# Patient Record
Sex: FEMALE
[De-identification: names, ages, dates, MRNs, and addresses within clinical notes are randomized per-mention and may not be internally consistent; named-entity substitution may affect disease eponyms.]

---

## 2024-04-03 PROBLEM — Z00.00 ENCOUNTER FOR PREVENTIVE HEALTH EXAMINATION: Status: ACTIVE | Noted: 2024-04-03

## 2024-04-24 ENCOUNTER — TRANSCRIPTION ENCOUNTER (OUTPATIENT)
Age: 69
End: 2024-04-24

## 2024-04-24 ENCOUNTER — APPOINTMENT (OUTPATIENT)
Dept: VASCULAR SURGERY | Facility: CLINIC | Age: 69
End: 2024-04-24
Payer: MEDICARE

## 2024-04-24 ENCOUNTER — NON-APPOINTMENT (OUTPATIENT)
Age: 69
End: 2024-04-24

## 2024-04-24 VITALS
HEIGHT: 65 IN | WEIGHT: 160 LBS | DIASTOLIC BLOOD PRESSURE: 74 MMHG | SYSTOLIC BLOOD PRESSURE: 105 MMHG | BODY MASS INDEX: 26.66 KG/M2 | HEART RATE: 100 BPM

## 2024-04-24 DIAGNOSIS — I87.2 VENOUS INSUFFICIENCY (CHRONIC) (PERIPHERAL): ICD-10-CM

## 2024-04-24 DIAGNOSIS — I78.1 NEVUS, NON-NEOPLASTIC: ICD-10-CM

## 2024-04-24 DIAGNOSIS — I83.93 ASYMPTOMATIC VARICOSE VEINS OF BILATERAL LOWER EXTREMITIES: ICD-10-CM

## 2024-04-24 PROCEDURE — 99203 OFFICE O/P NEW LOW 30 MIN: CPT

## 2024-04-24 PROCEDURE — 93970 EXTREMITY STUDY: CPT

## 2024-04-25 PROBLEM — I78.1 ASYMPTOMATIC SPIDER VEINS OF BOTH LOWER EXTREMITIES: Status: ACTIVE | Noted: 2024-04-25

## 2024-04-25 PROBLEM — I87.2 CHRONIC VENOUS INSUFFICIENCY: Status: ACTIVE | Noted: 2024-04-25

## 2024-04-25 PROBLEM — I83.93 ASYMPTOMATIC VARICOSE VEINS OF BOTH LOWER EXTREMITIES: Status: ACTIVE | Noted: 2024-04-25

## 2024-04-25 RX ORDER — ATORVASTATIN CALCIUM 10 MG/1
10 TABLET, FILM COATED ORAL
Refills: 0 | Status: ACTIVE | COMMUNITY

## 2024-04-25 RX ORDER — CHROMIUM 200 MCG
TABLET ORAL
Refills: 0 | Status: ACTIVE | COMMUNITY

## 2024-04-25 RX ORDER — FLUTICASONE PROPION/SALMETEROL 500-50 MCG
BLISTER, WITH INHALATION DEVICE INHALATION
Refills: 0 | Status: ACTIVE | COMMUNITY

## 2024-04-25 RX ORDER — OMEPRAZOLE 40 MG/1
40 CAPSULE, DELAYED RELEASE ORAL
Refills: 0 | Status: ACTIVE | COMMUNITY

## 2024-04-25 RX ORDER — ZINC OXIDE 13 %
CREAM (GRAM) TOPICAL
Refills: 0 | Status: ACTIVE | COMMUNITY

## 2024-04-29 NOTE — ADDENDUM
[FreeTextEntry1] : I, Dr. Jah Acevedo, personally performed the evaluation and management (E/M) services for this new patient.  That E/M includes conducting the initial examination, assessing all conditions, and establishing the plan of care.  Today, my ACP, Mago Griffiths NP, was here to observe my evaluation and management services for this patient to be followed going forward.  I, Lois Davis, am scribing for an in the presence of  Dr. Jah Acevedo on this date in the following sections HISTORY OF PRESENT ILLNESS, PAST MEDICAL/FAMILY/SOCIAL HISTORY; REVIEW OF SYSTEMS; VITAL SIGNS; PHYSICAL EXAM; ASSESSMENT/PLAN.

## 2024-04-29 NOTE — ASSESSMENT
[FreeTextEntry1] : 68 y/o F w/ remote hx of bilateral GSV ablations vs closure procedures, who presents with BLE symptomatic varicose veins (L>R)..  On exam, legs well perfused. LLE with bulging varicose veins on the medial aspect from the thigh to the calf + anterior aspect of the calf. RLE with mildly bulging varicose veins on the medial aspect of the thigh to the calf. No wounds or hyperpigmentation changes. BLE venous duplex showed negative DVT/SVT. GSV not visualized on either extremity c/w hx of GSV closures. RLE with vv measuring 4.8 mm in the calf and perforators. LLE with vv measuring 4.2 mm on the mid-thigh and perforators.  Findings discussed with pt. If she wishes to proceed with surgical treatment, the veins are interfering with her day-to-day activities, thus we discussed stab phlebectomy to relieve symptoms locally over the varices which would be completed in the hospital under general anesthesia. All complications, risks, and alternatives discussed; all questions were answered. Pt will let us know if she wants to proceed. Otherwise, keep skin moisturized and should try once more to wear compression stockings. Walking is encouraged, but standing in place or sitting for too long is not preferred. F/u prn. She will call us if she decides to proceed.

## 2024-04-29 NOTE — PHYSICAL EXAM
[2+] : left 2+ [Ankle Swelling (On Exam)] : not present [Varicose Veins Of Lower Extremities] : bilaterally [Ankle Swelling On The Right] : mild [] : not present [Abdomen Tenderness] : ~T ~M No abdominal tenderness [de-identified] : WN/WD [FreeTextEntry1] : Legs well perfused. LLE with bulging varicose veins on the medial aspect from the thigh to the calf + anterior aspect of the calf. RLE with mildly bulging varicose veins on the medial aspect of the thigh to the calf. No wounds or hyperpigmentation changes. [de-identified] : FROM

## 2024-04-29 NOTE — HISTORY OF PRESENT ILLNESS
[FreeTextEntry1] : 68 y/o F referred by Dr. Bar. She is a former pt of Dr Acevedo >10yrs ago. She has a PMHx of HTN, HLD, lung biopsy, appendectomy, repair of torn ligament, brain surgery (?stents 2012), and varicose veins with hx of RLE GSV ablation in 2008 by Dr. Acevedo, LLE GSV ablation in 2012 at [facility]. She presents for evaluation of recurrent varicose veins. Pt reports that after her LLE procedure at [facility], she continued to experience leg pain and heaviness that occurs mainly while standing. At her post op visit at [facility], she was informed that her L GSV was still "leaking." As a result, she stopped going to [facility] to check her veins. Denies any hx of leg trauma, hx of DVT/SVT, and ulcers. Pt points to varicose veins on the medial aspect of her L thigh and reports that they are very painful and itchy and one spot might have bled this past December. She explains her symptoms are interfering with her walking. Also endorses pain over her varices on her L ankle. She has tried compression stockings but these are hard for her to put and she does not tolerate them.   SHx: - Lives in [place] - Never smoker

## 2024-04-29 NOTE — PROCEDURE
[FreeTextEntry1] :  BLE venous duplex ordered to r/o insuff and eval vv, shows: negative DVT/SVT. GSV not visualized on either extremity c/w hx of GSV closures. RLE with vv measuring 4.8 mm in the calf and perforators. LLE with vv measuring 4.2 mm on the mid-thigh and perforators.

## 2025-07-22 ENCOUNTER — NON-APPOINTMENT (OUTPATIENT)
Age: 70
End: 2025-07-22

## 2025-07-22 ENCOUNTER — EMERGENCY (EMERGENCY)
Facility: HOSPITAL | Age: 70
LOS: 0 days | Discharge: LEFT BEFORE DISCHARGE | End: 2025-07-22
Attending: EMERGENCY MEDICINE
Payer: MEDICARE

## 2025-07-22 VITALS
SYSTOLIC BLOOD PRESSURE: 141 MMHG | HEART RATE: 93 BPM | RESPIRATION RATE: 18 BRPM | DIASTOLIC BLOOD PRESSURE: 69 MMHG | WEIGHT: 179.9 LBS | OXYGEN SATURATION: 100 % | TEMPERATURE: 98 F

## 2025-07-22 DIAGNOSIS — R00.2 PALPITATIONS: ICD-10-CM

## 2025-07-22 DIAGNOSIS — I10 ESSENTIAL (PRIMARY) HYPERTENSION: ICD-10-CM

## 2025-07-22 DIAGNOSIS — Z53.29 PROCEDURE AND TREATMENT NOT CARRIED OUT BECAUSE OF PATIENT'S DECISION FOR OTHER REASONS: ICD-10-CM

## 2025-07-22 DIAGNOSIS — Z88.0 ALLERGY STATUS TO PENICILLIN: ICD-10-CM

## 2025-07-22 DIAGNOSIS — E78.5 HYPERLIPIDEMIA, UNSPECIFIED: ICD-10-CM

## 2025-07-22 DIAGNOSIS — R07.89 OTHER CHEST PAIN: ICD-10-CM

## 2025-07-22 LAB
ALBUMIN SERPL ELPH-MCNC: 4.3 G/DL — SIGNIFICANT CHANGE UP (ref 3.5–5.2)
ALP SERPL-CCNC: 127 U/L — HIGH (ref 30–115)
ALT FLD-CCNC: 15 U/L — SIGNIFICANT CHANGE UP (ref 0–41)
ANION GAP SERPL CALC-SCNC: 12 MMOL/L — SIGNIFICANT CHANGE UP (ref 7–14)
AST SERPL-CCNC: 19 U/L — SIGNIFICANT CHANGE UP (ref 0–41)
BASOPHILS # BLD AUTO: 0.06 K/UL — SIGNIFICANT CHANGE UP (ref 0–0.2)
BASOPHILS NFR BLD AUTO: 0.6 % — SIGNIFICANT CHANGE UP (ref 0–1)
BILIRUB SERPL-MCNC: 0.3 MG/DL — SIGNIFICANT CHANGE UP (ref 0.2–1.2)
BUN SERPL-MCNC: 15 MG/DL — SIGNIFICANT CHANGE UP (ref 10–20)
CALCIUM SERPL-MCNC: 9.3 MG/DL — SIGNIFICANT CHANGE UP (ref 8.4–10.5)
CHLORIDE SERPL-SCNC: 102 MMOL/L — SIGNIFICANT CHANGE UP (ref 98–110)
CO2 SERPL-SCNC: 21 MMOL/L — SIGNIFICANT CHANGE UP (ref 17–32)
CREAT SERPL-MCNC: 1 MG/DL — SIGNIFICANT CHANGE UP (ref 0.7–1.5)
EGFR: 61 ML/MIN/1.73M2 — SIGNIFICANT CHANGE UP
EGFR: 61 ML/MIN/1.73M2 — SIGNIFICANT CHANGE UP
EOSINOPHIL # BLD AUTO: 0.37 K/UL — SIGNIFICANT CHANGE UP (ref 0–0.7)
EOSINOPHIL NFR BLD AUTO: 3.9 % — SIGNIFICANT CHANGE UP (ref 0–8)
GLUCOSE SERPL-MCNC: 104 MG/DL — HIGH (ref 70–99)
HCT VFR BLD CALC: 39.8 % — SIGNIFICANT CHANGE UP (ref 37–47)
HGB BLD-MCNC: 13 G/DL — SIGNIFICANT CHANGE UP (ref 12–16)
IMM GRANULOCYTES NFR BLD AUTO: 0.3 % — SIGNIFICANT CHANGE UP (ref 0.1–0.3)
LYMPHOCYTES # BLD AUTO: 1.58 K/UL — SIGNIFICANT CHANGE UP (ref 1.2–3.4)
LYMPHOCYTES # BLD AUTO: 16.5 % — LOW (ref 20.5–51.1)
MAGNESIUM SERPL-MCNC: 2.1 MG/DL — SIGNIFICANT CHANGE UP (ref 1.8–2.4)
MCHC RBC-ENTMCNC: 28.8 PG — SIGNIFICANT CHANGE UP (ref 27–31)
MCHC RBC-ENTMCNC: 32.7 G/DL — SIGNIFICANT CHANGE UP (ref 32–37)
MCV RBC AUTO: 88.2 FL — SIGNIFICANT CHANGE UP (ref 81–99)
MONOCYTES # BLD AUTO: 0.66 K/UL — HIGH (ref 0.1–0.6)
MONOCYTES NFR BLD AUTO: 6.9 % — SIGNIFICANT CHANGE UP (ref 1.7–9.3)
NEUTROPHILS # BLD AUTO: 6.86 K/UL — HIGH (ref 1.4–6.5)
NEUTROPHILS NFR BLD AUTO: 71.8 % — SIGNIFICANT CHANGE UP (ref 42.2–75.2)
NRBC BLD AUTO-RTO: 0 /100 WBCS — SIGNIFICANT CHANGE UP (ref 0–0)
NT-PROBNP SERPL-SCNC: 694 PG/ML — HIGH (ref 0–300)
PLATELET # BLD AUTO: 391 K/UL — SIGNIFICANT CHANGE UP (ref 130–400)
PMV BLD: 9.4 FL — SIGNIFICANT CHANGE UP (ref 7.4–10.4)
POTASSIUM SERPL-MCNC: 4.7 MMOL/L — SIGNIFICANT CHANGE UP (ref 3.5–5)
POTASSIUM SERPL-SCNC: 4.7 MMOL/L — SIGNIFICANT CHANGE UP (ref 3.5–5)
PROT SERPL-MCNC: 6.3 G/DL — SIGNIFICANT CHANGE UP (ref 6–8)
RBC # BLD: 4.51 M/UL — SIGNIFICANT CHANGE UP (ref 4.2–5.4)
RBC # FLD: 14.8 % — HIGH (ref 11.5–14.5)
SODIUM SERPL-SCNC: 135 MMOL/L — SIGNIFICANT CHANGE UP (ref 135–146)
TROPONIN T, HIGH SENSITIVITY RESULT: 41 NG/L — HIGH (ref 6–13)
TROPONIN T, HIGH SENSITIVITY RESULT: 47 NG/L — HIGH (ref 6–13)
WBC # BLD: 9.56 K/UL — SIGNIFICANT CHANGE UP (ref 4.8–10.8)
WBC # FLD AUTO: 9.56 K/UL — SIGNIFICANT CHANGE UP (ref 4.8–10.8)

## 2025-07-22 PROCEDURE — 83880 ASSAY OF NATRIURETIC PEPTIDE: CPT

## 2025-07-22 PROCEDURE — 93005 ELECTROCARDIOGRAM TRACING: CPT

## 2025-07-22 PROCEDURE — 85025 COMPLETE CBC W/AUTO DIFF WBC: CPT

## 2025-07-22 PROCEDURE — 71046 X-RAY EXAM CHEST 2 VIEWS: CPT

## 2025-07-22 PROCEDURE — 83735 ASSAY OF MAGNESIUM: CPT

## 2025-07-22 PROCEDURE — 99285 EMERGENCY DEPT VISIT HI MDM: CPT | Mod: FS

## 2025-07-22 PROCEDURE — 36000 PLACE NEEDLE IN VEIN: CPT

## 2025-07-22 PROCEDURE — 36415 COLL VENOUS BLD VENIPUNCTURE: CPT

## 2025-07-22 PROCEDURE — 71046 X-RAY EXAM CHEST 2 VIEWS: CPT | Mod: 26

## 2025-07-22 PROCEDURE — 99285 EMERGENCY DEPT VISIT HI MDM: CPT | Mod: 25

## 2025-07-22 PROCEDURE — 84484 ASSAY OF TROPONIN QUANT: CPT

## 2025-07-22 PROCEDURE — 80053 COMPREHEN METABOLIC PANEL: CPT

## 2025-07-22 PROCEDURE — 93010 ELECTROCARDIOGRAM REPORT: CPT

## 2025-07-22 RX ORDER — ACETAMINOPHEN 500 MG/5ML
650 LIQUID (ML) ORAL ONCE
Refills: 0 | Status: DISCONTINUED | OUTPATIENT
Start: 2025-07-22 | End: 2025-07-22

## 2025-07-22 RX ORDER — ACETAMINOPHEN 500 MG/5ML
975 LIQUID (ML) ORAL ONCE
Refills: 0 | Status: COMPLETED | OUTPATIENT
Start: 2025-07-22 | End: 2025-07-22

## 2025-07-22 RX ADMIN — Medication 975 MILLIGRAM(S): at 16:21

## 2025-07-22 NOTE — ED PROVIDER NOTE - PHYSICAL EXAMINATION
CONST: Well appearing in NAD  EYES: PERRL, EOMI, Sclera and conjunctiva clear.   ENT: Oropharynx normal appearing, no erythema or exudates. Uvula midline.  CARD: Normal S1 S2; Normal rate and rhythm  RESP: Equal BS B/L, No wheezes, rhonchi or rales. No distress  GI: Soft, non-tender, non-distended.  MS: Normal ROM in all extremities. No midline spinal tenderness.   SKIN: Warm, dry, no acute rashes. Well healing midline R knee scar.   NEURO: A&Ox3, No focal deficits. Strength 5/5 with no sensory deficits. Steady gait

## 2025-07-22 NOTE — ED PROVIDER NOTE - PATIENT PORTAL LINK FT
You can access the FollowMyHealth Patient Portal offered by [facility] by registering at the following website: [email or web address]. By joining [employer]’s FollowMyHealth portal, you will also be able to view your health information using other applications (apps) compatible with our system.

## 2025-07-22 NOTE — ED PROVIDER NOTE - NSFOLLOWUPINSTRUCTIONS_ED_ALL_ED_FT
Palpitations    Please follow up with your cardiologist with the next 3 days.     A palpitation is the feeling that your heartbeat is irregular or is faster than normal. It may feel like your heart is fluttering or skipping a beat. They may be caused by many things, including smoking, caffeine, alcohol, stress, and certain medicines. Although most causes of palpitations are not serious, palpitations can be a sign of a serious medical problem. Avoid caffeine, alcohol, and tobacco products at home. Try to reduce stress and anxiety and make sure to get plenty of rest.     SEEK IMMEDIATE MEDICAL CARE IF YOU HAVE ANY OF THE FOLLOWING SYMPTOMS: chest pain, shortness of breath, severe headache, dizziness/lightheadedness, or fainting.

## 2025-07-22 NOTE — ED PROVIDER NOTE - CLINICAL SUMMARY MEDICAL DECISION MAKING FREE TEXT BOX
70-year-old female with past medical history of HTN, HLD, sarcoidosis, presents to the emergency department for evaluation of palpitations that began at 1 AM this morning.  Patient states she was resting in bed when she began to experience the symptoms and then proceeded to have trouble sleeping.  Patient also notes chest discomfort that lasted for a few seconds at that time but has resolved.  Patient has not had any recent fever, headache, dizziness, lightheadedness, difficulty breathing, nausea, vomiting, diarrhea, or diaphoresis.    PHYSICAL EXAM: I have reviewed current vital signs.  GENERAL: NAD, well-nourished; well-developed.  HEAD:  Normocephalic, atraumatic.  EYES: Conjunctiva and sclera clear.  ENT: MMM, no erythema/exudates.  CHEST/LUNG: Clear to auscultation bilaterally; no wheezes, rales, or rhonchi.  HEART: Regular rate and rhythm, normal S1 and S2; no murmurs, rubs, or gallops.  ABDOMEN: Soft, nontender, nondistended.  EXTREMITIES:  2+ peripheral pulses; no clubbing, cyanosis, or edema.  PSYCH: Cooperative, appropriate, normal mood and affect.  NEUROLOGY: A&O x 3.  No focal neurological deficits.  SKIN: Warm and dry.    Workup significant for elevated troponin.  Shared decision making with patient regarding admission to medicine or observation unit.  Patient does not want to stay in the hospital and would like to leave AGAINST MEDICAL ADVICE.  I had extensive discussion of risks and benefits of pursuing further medical evaluation and/or care with patient and any available family/friends; patient still electing to leave against medical advice. Patient is awake, alert, oriented and demonstrates full capacity and insight into illness. Patient aware and encouraged to return immediately to ED or nearest ED if patient decides to change mind regarding care or if patient experiences any new, worsening, or concerning symptoms.   Explained to patient the importance of following up with cardiologist and patient expressed understanding.

## 2025-07-22 NOTE — ED PROVIDER NOTE - OBJECTIVE STATEMENT
71 yo female hx of htn, hld, presents for eval of palpations x 1 days, no palliating or provoking factors. No associated SOB, n/v/d, abd pain, diaphoresis, CP, abd pain, leg swelling.

## 2025-07-23 ENCOUNTER — EMERGENCY (EMERGENCY)
Facility: HOSPITAL | Age: 70
LOS: 0 days | Discharge: HOME | End: 2025-07-24
Attending: EMERGENCY MEDICINE
Payer: MEDICARE

## 2025-07-23 VITALS
TEMPERATURE: 98 F | SYSTOLIC BLOOD PRESSURE: 134 MMHG | HEART RATE: 99 BPM | OXYGEN SATURATION: 99 % | RESPIRATION RATE: 18 BRPM | WEIGHT: 179.9 LBS | HEIGHT: 64 IN | DIASTOLIC BLOOD PRESSURE: 82 MMHG

## 2025-07-23 DIAGNOSIS — R00.2 PALPITATIONS: ICD-10-CM

## 2025-07-23 DIAGNOSIS — I10 ESSENTIAL (PRIMARY) HYPERTENSION: ICD-10-CM

## 2025-07-23 DIAGNOSIS — Z88.0 ALLERGY STATUS TO PENICILLIN: ICD-10-CM

## 2025-07-23 DIAGNOSIS — Z88.8 ALLERGY STATUS TO OTHER DRUGS, MEDICAMENTS AND BIOLOGICAL SUBSTANCES: ICD-10-CM

## 2025-07-23 DIAGNOSIS — R06.02 SHORTNESS OF BREATH: ICD-10-CM

## 2025-07-23 DIAGNOSIS — R91.8 OTHER NONSPECIFIC ABNORMAL FINDING OF LUNG FIELD: ICD-10-CM

## 2025-07-23 DIAGNOSIS — D86.9 SARCOIDOSIS, UNSPECIFIED: ICD-10-CM

## 2025-07-23 DIAGNOSIS — R60.0 LOCALIZED EDEMA: ICD-10-CM

## 2025-07-23 DIAGNOSIS — E78.00 PURE HYPERCHOLESTEROLEMIA, UNSPECIFIED: ICD-10-CM

## 2025-07-23 DIAGNOSIS — Z82.49 FAMILY HISTORY OF ISCHEMIC HEART DISEASE AND OTHER DISEASES OF THE CIRCULATORY SYSTEM: ICD-10-CM

## 2025-07-23 DIAGNOSIS — K76.89 OTHER SPECIFIED DISEASES OF LIVER: ICD-10-CM

## 2025-07-23 DIAGNOSIS — I45.10 UNSPECIFIED RIGHT BUNDLE-BRANCH BLOCK: ICD-10-CM

## 2025-07-23 LAB
ALBUMIN SERPL ELPH-MCNC: 3.7 G/DL — SIGNIFICANT CHANGE UP (ref 3.5–5.2)
ALP SERPL-CCNC: 110 U/L — SIGNIFICANT CHANGE UP (ref 30–115)
ALT FLD-CCNC: 14 U/L — SIGNIFICANT CHANGE UP (ref 0–41)
ANION GAP SERPL CALC-SCNC: 11 MMOL/L — SIGNIFICANT CHANGE UP (ref 7–14)
APTT BLD: 26.4 SEC — LOW (ref 27–39.2)
AST SERPL-CCNC: 17 U/L — SIGNIFICANT CHANGE UP (ref 0–41)
BASOPHILS # BLD AUTO: 0.05 K/UL — SIGNIFICANT CHANGE UP (ref 0–0.2)
BASOPHILS NFR BLD AUTO: 0.8 % — SIGNIFICANT CHANGE UP (ref 0–1)
BILIRUB SERPL-MCNC: 0.6 MG/DL — SIGNIFICANT CHANGE UP (ref 0.2–1.2)
BUN SERPL-MCNC: 17 MG/DL — SIGNIFICANT CHANGE UP (ref 10–20)
CALCIUM SERPL-MCNC: 8.5 MG/DL — SIGNIFICANT CHANGE UP (ref 8.4–10.5)
CHLORIDE SERPL-SCNC: 98 MMOL/L — SIGNIFICANT CHANGE UP (ref 98–110)
CO2 SERPL-SCNC: 21 MMOL/L — SIGNIFICANT CHANGE UP (ref 17–32)
CREAT SERPL-MCNC: 1.1 MG/DL — SIGNIFICANT CHANGE UP (ref 0.7–1.5)
EGFR: 54 ML/MIN/1.73M2 — LOW
EGFR: 54 ML/MIN/1.73M2 — LOW
EOSINOPHIL # BLD AUTO: 0.4 K/UL — SIGNIFICANT CHANGE UP (ref 0–0.7)
EOSINOPHIL NFR BLD AUTO: 6.2 % — SIGNIFICANT CHANGE UP (ref 0–8)
GLUCOSE SERPL-MCNC: 147 MG/DL — HIGH (ref 70–99)
HCT VFR BLD CALC: 33.5 % — LOW (ref 37–47)
HGB BLD-MCNC: 10.9 G/DL — LOW (ref 12–16)
IMM GRANULOCYTES NFR BLD AUTO: 0.5 % — HIGH (ref 0.1–0.3)
INR BLD: 1 RATIO — SIGNIFICANT CHANGE UP (ref 0.65–1.3)
LYMPHOCYTES # BLD AUTO: 0.86 K/UL — LOW (ref 1.2–3.4)
LYMPHOCYTES # BLD AUTO: 13.4 % — LOW (ref 20.5–51.1)
MCHC RBC-ENTMCNC: 28.8 PG — SIGNIFICANT CHANGE UP (ref 27–31)
MCHC RBC-ENTMCNC: 32.5 G/DL — SIGNIFICANT CHANGE UP (ref 32–37)
MCV RBC AUTO: 88.6 FL — SIGNIFICANT CHANGE UP (ref 81–99)
MONOCYTES # BLD AUTO: 0.38 K/UL — SIGNIFICANT CHANGE UP (ref 0.1–0.6)
MONOCYTES NFR BLD AUTO: 5.9 % — SIGNIFICANT CHANGE UP (ref 1.7–9.3)
NEUTROPHILS # BLD AUTO: 4.7 K/UL — SIGNIFICANT CHANGE UP (ref 1.4–6.5)
NEUTROPHILS NFR BLD AUTO: 73.2 % — SIGNIFICANT CHANGE UP (ref 42.2–75.2)
NRBC BLD AUTO-RTO: 0 /100 WBCS — SIGNIFICANT CHANGE UP (ref 0–0)
NT-PROBNP SERPL-SCNC: 215 PG/ML — SIGNIFICANT CHANGE UP (ref 0–300)
PLATELET # BLD AUTO: 293 K/UL — SIGNIFICANT CHANGE UP (ref 130–400)
PMV BLD: 9.3 FL — SIGNIFICANT CHANGE UP (ref 7.4–10.4)
POTASSIUM SERPL-MCNC: 4.3 MMOL/L — SIGNIFICANT CHANGE UP (ref 3.5–5)
POTASSIUM SERPL-SCNC: 4.3 MMOL/L — SIGNIFICANT CHANGE UP (ref 3.5–5)
PROT SERPL-MCNC: 5.3 G/DL — LOW (ref 6–8)
PROTHROM AB SERPL-ACNC: 11.8 SEC — SIGNIFICANT CHANGE UP (ref 9.95–12.87)
RBC # BLD: 3.78 M/UL — LOW (ref 4.2–5.4)
RBC # FLD: 14.8 % — HIGH (ref 11.5–14.5)
SODIUM SERPL-SCNC: 130 MMOL/L — LOW (ref 135–146)
TROPONIN T, HIGH SENSITIVITY RESULT: 21 NG/L — HIGH (ref 6–13)
TROPONIN T, HIGH SENSITIVITY RESULT: 24 NG/L — HIGH (ref 6–13)
WBC # BLD: 6.42 K/UL — SIGNIFICANT CHANGE UP (ref 4.8–10.8)
WBC # FLD AUTO: 6.42 K/UL — SIGNIFICANT CHANGE UP (ref 4.8–10.8)

## 2025-07-23 PROCEDURE — 93970 EXTREMITY STUDY: CPT | Mod: 26

## 2025-07-23 PROCEDURE — 93005 ELECTROCARDIOGRAM TRACING: CPT

## 2025-07-23 PROCEDURE — 99223 1ST HOSP IP/OBS HIGH 75: CPT | Mod: FS

## 2025-07-23 PROCEDURE — 71045 X-RAY EXAM CHEST 1 VIEW: CPT

## 2025-07-23 PROCEDURE — 85610 PROTHROMBIN TIME: CPT

## 2025-07-23 PROCEDURE — 93970 EXTREMITY STUDY: CPT

## 2025-07-23 PROCEDURE — 84484 ASSAY OF TROPONIN QUANT: CPT

## 2025-07-23 PROCEDURE — 83880 ASSAY OF NATRIURETIC PEPTIDE: CPT

## 2025-07-23 PROCEDURE — 71275 CT ANGIOGRAPHY CHEST: CPT | Mod: 26

## 2025-07-23 PROCEDURE — 85730 THROMBOPLASTIN TIME PARTIAL: CPT

## 2025-07-23 PROCEDURE — 85025 COMPLETE CBC W/AUTO DIFF WBC: CPT

## 2025-07-23 PROCEDURE — G0378: CPT

## 2025-07-23 PROCEDURE — 71275 CT ANGIOGRAPHY CHEST: CPT

## 2025-07-23 PROCEDURE — 80053 COMPREHEN METABOLIC PANEL: CPT

## 2025-07-23 PROCEDURE — 99285 EMERGENCY DEPT VISIT HI MDM: CPT | Mod: 25

## 2025-07-23 PROCEDURE — 36415 COLL VENOUS BLD VENIPUNCTURE: CPT

## 2025-07-23 PROCEDURE — 93010 ELECTROCARDIOGRAM REPORT: CPT

## 2025-07-23 PROCEDURE — 71045 X-RAY EXAM CHEST 1 VIEW: CPT | Mod: 26

## 2025-07-23 RX ORDER — ATORVASTATIN CALCIUM 80 MG/1
10 TABLET, FILM COATED ORAL AT BEDTIME
Refills: 0 | Status: DISCONTINUED | OUTPATIENT
Start: 2025-07-23 | End: 2025-07-24

## 2025-07-23 RX ORDER — ASPIRIN 325 MG
81 TABLET ORAL DAILY
Refills: 0 | Status: DISCONTINUED | OUTPATIENT
Start: 2025-07-23 | End: 2025-07-24

## 2025-07-23 RX ORDER — GABAPENTIN 400 MG/1
600 CAPSULE ORAL AT BEDTIME
Refills: 0 | Status: DISCONTINUED | OUTPATIENT
Start: 2025-07-23 | End: 2025-07-24

## 2025-07-23 RX ADMIN — ATORVASTATIN CALCIUM 10 MILLIGRAM(S): 80 TABLET, FILM COATED ORAL at 22:58

## 2025-07-23 RX ADMIN — GABAPENTIN 600 MILLIGRAM(S): 400 CAPSULE ORAL at 22:59

## 2025-07-23 NOTE — ED PROVIDER NOTE - CHILD ABUSE FACILITY
"Lisinopril 20MG   Last Written Prescription Date:  08/31/2018  Last Fill Quantity: 90,  # refills: 3   Last office visit: 8/31/2018 with prescribing provider:  Yes, but pt had E-vist on 05/13/2019 with prescribing provider   Future Office Visit:      Requested Prescriptions   Pending Prescriptions Disp Refills     lisinopril (PRINIVIL/ZESTRIL) 20 MG tablet [Pharmacy Med Name: LISINOPRIL 20 MG TABLET] 90 tablet 3     Sig: TAKE 1 TABLET BY MOUTH EVERY DAY       ACE Inhibitors (Including Combos) Protocol Passed - 8/16/2019 12:59 AM        Passed - Blood pressure under 140/90 in past 12 months     BP Readings from Last 3 Encounters:   03/05/19 124/83   02/25/19 131/83   02/20/19 (!) 155/96                 Passed - Recent (12 mo) or future (30 days) visit within the authorizing provider's specialty     Patient had office visit in the last 12 months or has a visit in the next 30 days with authorizing provider or within the authorizing provider's specialty.  See \"Patient Info\" tab in inbasket, or \"Choose Columns\" in Meds & Orders section of the refill encounter.              Passed - Medication is active on med list        Passed - Patient is age 18 or older        Passed - No active pregnancy on record        Passed - Normal serum creatinine on file in past 12 months     Recent Labs   Lab Test 01/14/19  0855   CR 0.65             Passed - Normal serum potassium on file in past 12 months     Recent Labs   Lab Test 01/14/19  0855   POTASSIUM 3.7             Passed - No positive pregnancy test within past 12 months          " [facility]

## 2025-07-23 NOTE — ED PROVIDER NOTE - DIFFERENTIAL DIAGNOSIS
Differential Diagnosis Acute coronary syndrome, Stable angina , Pneumonia Viral pleuritis. GERD.Costochondritis.Anxiety or panic disorder, Aortic dissection, myocarditis, pericarditis, zoster

## 2025-07-23 NOTE — ED PROVIDER NOTE - WHICH SHOWED
ECG: NSR at 95, RBBB similar to yesterday's ECG, minimal ST changes to I,aVL  Chest x-ray: ECG: NSR at 95, RBBB similar to yesterday's ECG, minimal ST changes to I,aVL  Chest x-ray: No acute dz

## 2025-07-23 NOTE — ED PROVIDER NOTE - OBJECTIVE STATEMENT
70-year-old female with history of hypertension, high cholesterol, asthma, sarcoidosis, recent knee replacement presents to the ED complaining of shortness of breath that started this morning.  Patient was seen in the ED yesterday for palpitations.  Patient saw her cardiologist today who sent her to the ED to rule out PE.  Patient denies any chest pain, cough, fever, chills or weakness.

## 2025-07-23 NOTE — ED CDU PROVIDER INITIAL DAY NOTE - ATTENDING APP SHARED VISIT CONTRIBUTION OF CARE
70-year-old female past medical history of past medical history hypertension hyperlipidemia sarcoid right knee replacement 2 weeks ago presents with few days of shortness of breath and palpitations.  Patient seen in ED had EKG done that was normal sinus, right bundle branch block.  Troponin 24 and 21.  Cardiologist in [place].  Non-smoker.  CTA negative for PE.  Placed in observation unit for CCTA.  Feels fine no complaints at this time.    On exam, AFVSS, Well appearing, No acute distress, NCAT, EOMI, PERRLA, MMM, Neck supple, LCTAB, RRR nl s1s2 No mrg, Abdomen Soft NTND, AAOx3, No Focal Deficits, No LE edema or calf TTP,    A/P; shortness of breath palpitations pending CCTA, ACS and PE ruled out.

## 2025-07-23 NOTE — ED PROVIDER NOTE - PHYSICAL EXAMINATION
Vital Signs: I have reviewed the initial vital signs.  Constitutional: NAD, well-nourished, appears stated age, no acute distress.  HEENT: Airway patent, moist MM, no erythema/swelling/deformity of oral structures. EOMI, PERRLA.  CV: regular rate, regular rhythm, well-perfused extremities, 2+ b/l DP and radial pulses equal.  Lungs: BCTA, no increased WOB.  ABD: NTND, no guarding or rebound, no pulsatile mass, no hernias.   MSK: Neck supple, nontender, nl ROM, no stepoff. Chest nontender. Back nontender in TLS spine or to b/l bony structures or flanks. Ext nontender, nl rom, no deformity.   INTEG: +LE edema.  NEURO: A&Ox3, normal strength, nl sensation throughout, normal speech.   PSYCH: Calm, cooperative, normal affect and interaction.

## 2025-07-23 NOTE — ED PROVIDER NOTE - CLINICAL SUMMARY MEDICAL DECISION MAKING FREE TEXT BOX
Pt evaluated in the ED for chest pain, ekg x2 reviewed and cxr wnl, ct pe shows no acute pulmonary process, trop x2 wnl, placed into observation unit for ccta and telemetry monitoring.

## 2025-07-23 NOTE — ED PROVIDER NOTE - IV ALTEPLASE DOOR HIDDEN
Caller: SELAM SANCHEZ    Relationship: Emergency Contact    Best call back number: [phone]    What medication are you requesting: ANTIBIOTICS      THINKS IT WAS TRICYCLOLENE     What are your current symptoms: ABSCESS IN HER MOUTH FROM DENTURES     How long have you been experiencing symptoms: PATIENT NOTICED THIS MORNING     Have you had these symptoms before:    [x] Yes  [] No    Have you been treated for these symptoms before:   [x] Yes  [] No    If a prescription is needed, what is your preferred pharmacy and phone number: [facility] - [place], [place] - [address] - [phone] [facility] [phone] FX     Additional notes:    PLEASE CALL IF NEEDS TO BE SEEN      show

## 2025-07-23 NOTE — ED PROVIDER NOTE - ATTENDING APP SHARED VISIT CONTRIBUTION OF CARE
70-year-old female with history of HTN, HLD, asthma, sarcoidosis, right total knee replacement 2 weeks ago, non-smoker, +FHx of CAD, presents with  with shortness of breath that began this morning, feels like shallow breathing. 2 nights ago she was having palpitations and came to the ED and had downtrending troponin and went home, palpitations have resolved. Denies all other symptoms including chest pain, cough, fever, vomiting, dizziness. Sent in by her cardiologist Dr. Fortune to r/o PE/DVT given recent knee surgery and report of new right bundle branch block. Patient currently feels only mild shallow breathing with no other symptoms. On exam, afebrile, hemodynamically stable, saturating well on room air, NAD, well appearing, sitting comfortably in bed, no WOB/tachypnea, speaking full sentences, head NCAT, EOMI grossly, anicteric, MMM, no JVD, RRR, nml S1/S2, no m/r/g, lungs CTAB, no w/r/r, abd soft, NT, ND, nml BS, no rebound or guarding, AAO, CN's 3-12 grossly intact, AG spontaneously, mild right leg edema, sutures C/D/I, skin warm, well perfused, no rashes or hives. 70-year-old female with history of HTN, HLD, asthma, sarcoidosis, right total knee replacement 2 weeks ago, non-smoker, +FHx of CAD, presents with  with shortness of breath that began this morning, feels like shallow breathing. 2 nights ago she was having palpitations and came to the ED and had downtrending troponin and went home, palpitations have resolved. Denies all other symptoms including chest pain, cough, fever, vomiting, dizziness. Sent in by her cardiologist Dr. Fortune to r/o PE/DVT given recent knee surgery and report of new right bundle branch block. Patient currently feels only mild shallow breathing with no other symptoms. On exam, afebrile, hemodynamically stable, saturating well on room air, NAD, well appearing, sitting comfortably in bed, no WOB/tachypnea, speaking full sentences, head NCAT, EOMI grossly, anicteric, MMM, no JVD, RRR, nml S1/S2, no m/r/g, lungs CTAB, no w/r/r, abd soft, NT, ND, nml BS, no rebound or guarding, AAO, CN's 3-12 grossly intact, AG spontaneously, mild right leg edema, sutures C/D/I, skin warm, well perfused, no rashes or hives. Character low suspicion for PE, CTA pending given risk. Character low suspicion for ACS and ECG nonischemic and trop downtrending from before; cardiology initially consulted due to possible RICHARD on ECG but then repeat ECG improved and initial may have been artifact. No e/o PNA, PTX, or fluid overload on exam or CXR, and does not warrant antibiotics or diuretics at this time. No significant arrhythmia to warrant admission for cardiac monitoring. No significant anemia to warrant blood transfusion at this time. 70-year-old female with history of HTN, HLD, asthma, sarcoidosis, right total knee replacement 2 weeks ago, non-smoker, +FHx of CAD, presents with  with shortness of breath that began this morning, feels like shallow breathing. 2 nights ago she was having palpitations and came to the ED and had downtrending troponin and went home, palpitations have resolved. Denies all other symptoms including chest pain, cough, fever, vomiting, dizziness. Sent in by her cardiologist Dr. Fortune to r/o PE/DVT given recent knee surgery and report of new right bundle branch block. Patient currently feels only mild shallow breathing with no other symptoms. On exam, afebrile, hemodynamically stable, saturating well on room air, NAD, well appearing, sitting comfortably in bed, no WOB/tachypnea, speaking full sentences, head NCAT, EOMI grossly, anicteric, MMM, no JVD, RRR, nml S1/S2, no m/r/g, lungs CTAB, no w/r/r, abd soft, NT, ND, nml BS, no rebound or guarding, AAO, CN's 3-12 grossly intact, AG spontaneously, mild right leg edema, sutures C/D/I, skin warm, well perfused, no rashes or hives. Character low suspicion for PE, CTA pending given risk. Character low suspicion for ACS and ECG nonischemic and trop downtrending from before; cardiology initially consulted due to possible RICHARD on ECG but then repeat ECG improved and initial may have been artifact. No e/o PNA, PTX, or fluid overload on exam or CXR, and does not warrant antibiotics or diuretics at this time. No significant arrhythmia to warrant admission for cardiac monitoring. No significant anemia to warrant blood transfusion at this time. NAD, nontoxic appearing.  Signed off care to Dr. KAUSHAL Izquierdo, will follow-up CT.

## 2025-07-23 NOTE — ED ADULT NURSE NOTE - CAS EDN DISCHARGE INTERVENTIONS
12ccs of bloody fluid aspirated from patient's right knee and sent for Synovasure. IV discontinued, cath removed intact

## 2025-07-23 NOTE — ED PROVIDER NOTE - PROGRESS NOTE DETAILS
D/w Dr. ANJU Amado of cardio, will see pt Duplex LE no dvt per tech. NAD, nontoxic appearing.  Signed off care to Dr. KAUSHAL Izquierdo, will follow-up CT. pk: on re-eval pt resting comfortably, CT PE study unremarkable, pt offered obs stay for ccta given no prior history of ccta, pt agrees. Patient agreeable with plan and demonstrates understanding.

## 2025-07-23 NOTE — ED CDU PROVIDER INITIAL DAY NOTE - PHYSICAL EXAMINATION
Vital Signs: I have reviewed the initial vital signs.  Constitutional: NAD, well-nourished, appears stated age, no acute distress.  HEENT: Airway patent, moist MM, no erythema/swelling/deformity of oral structures. EOMI, PERRLA.  CV: regular rate, regular rhythm, well-perfused extremities, 2+ b/l DP and radial pulses equal.  Lungs: BCTA, no increased WOB.  ABD: NT, ND, no guarding or rebound, no pulsatile mass, no hernias.   MSK: Neck supple, nontender, nl ROM, no stepoff. Chest nontender. Back nontender in TLS spine or to b/l bony structures or flanks. Ext nontender, nl rom, no deformity.   INTEG: +LE edema.  NEURO: A&Ox3, normal strength, nl sensation throughout, normal speech.   PSYCH: Calm, cooperative, normal affect and interaction.

## 2025-07-24 VITALS
HEART RATE: 55 BPM | TEMPERATURE: 98 F | SYSTOLIC BLOOD PRESSURE: 138 MMHG | DIASTOLIC BLOOD PRESSURE: 77 MMHG | OXYGEN SATURATION: 100 %

## 2025-07-24 PROCEDURE — 99239 HOSP IP/OBS DSCHRG MGMT >30: CPT

## 2025-07-24 PROCEDURE — 99284 EMERGENCY DEPT VISIT MOD MDM: CPT

## 2025-07-24 RX ORDER — METOPROLOL SUCCINATE 50 MG/1
100 TABLET, EXTENDED RELEASE ORAL ONCE
Refills: 0 | Status: COMPLETED | OUTPATIENT
Start: 2025-07-24 | End: 2025-07-24

## 2025-07-24 RX ORDER — ACETAMINOPHEN 500 MG/5ML
975 LIQUID (ML) ORAL ONCE
Refills: 0 | Status: COMPLETED | OUTPATIENT
Start: 2025-07-24 | End: 2025-07-24

## 2025-07-24 RX ADMIN — Medication 81 MILLIGRAM(S): at 11:12

## 2025-07-24 RX ADMIN — METOPROLOL SUCCINATE 100 MILLIGRAM(S): 50 TABLET, EXTENDED RELEASE ORAL at 06:26

## 2025-07-24 RX ADMIN — Medication 40 MILLIGRAM(S): at 06:26

## 2025-07-24 RX ADMIN — Medication 975 MILLIGRAM(S): at 06:36

## 2025-07-24 NOTE — CONSULT NOTE ADULT - SUBJECTIVE AND OBJECTIVE BOX
Outpt cardiologist:  Dr Lazarus Arana    HPI:  70 year old female with PMHx HTN, HLD, sarcoidosis (pulmonary biopsy dx), who presented to the ED with complaint of palpitations. Patient underwent right total knee arthroplasty 2 weeks prior to presentation. She noticed fast heart beat sensation while at rest at home. She was advised by outpatient cardiologist to go to emergency room to rule out PE. Patient denies any chest pain or pressure, shortness of breath, lightheadedness, syncope.     She was placed in ED OBS for CT coronary scan. Patient then informed that she passed out after accidentally taking her husbands nitroglycerin tab 10 years ago. She also notes similar experience during nuclear stress test 2 years ago. Cardiology was consulted for further recommendations in light of this history.     Workup in ED:  Hemodynamically stable  EKG with sinus rhythm and RBBB  hs-trop 24>21   CTPA negative for PE    On exam, patient in no acute distress. Denies recurrence of palpitations, chest pain, SOB.       PAST MEDICAL & SURGICAL HISTORY  Hypertension    High cholesterol        FAMILY HISTORY:  FAMILY HISTORY:  Father -- CAD,  of MI age late 50s  Mother -- CAD,  age late 70s unclear cause presumed cardiac    SOCIAL HISTORY:  Social History:  Never smoker    ALLERGIES:  penicillin (Unknown)      MEDICATIONS:  aspirin  chewable 81 milliGRAM(s) Oral daily  atorvastatin 10 milliGRAM(s) Oral at bedtime  gabapentin 600 milliGRAM(s) Oral at bedtime  pantoprazole    Tablet 40 milliGRAM(s) Oral before breakfast    PRN:      HOME MEDICATIONS:  Home Medications:  olmesartan  atorvastatin  gabapentin  omeprazole      VITALS:   T(F): 98.1 ( @ 08:24), Max: 98.4 ( @ 14:25)  HR: 55 ( @ 08:24) (55 - 99)  BP: 138/77 ( @ 08:24) (118/73 - 141/69)  BP(mean): --  RR: 17 ( @ 05:46) (17 - 18)  SpO2: 100% ( @ 08:24) (96% - 100%)       REVIEW OF SYSTEMS:  Negative unless specified above.     PHYSICAL EXAM:  General: Not in distress.  Non-toxic appearing.   HEENT: - JVD  Cardio: regular rhythm, normal rate, no murmur  Pulm: Clear air entry bilaterally, no wheezing, rales, rhonchi  Abdomen: Soft, non-tende   Extremities: No edema in bilateral LE  Neuro: A&O x3. No focal deficits    LABS:                        10.9   6.42  )-----------( 293      ( 2025 15:00 )             33.5     07-23    130[L]  |  98  |  17  ----------------------------<  147[H]  4.3   |  21  |  1.1    Ca    8.5      2025 15:00  Mg     2.1     07-    TPro  5.3[L]  /  Alb  3.7  /  TBili  0.6  /  DBili  x   /  AST  17  /  ALT  14  /  AlkPhos  110  07-    PT/INR - ( 2025 15:00 )   PT: 11.80 sec;   INR: 1.00 ratio         PTT - ( 2025 15:00 )  PTT:26.4 sec      RADIOLOGY:  -CXR:  < from: Xray Chest 1 View- PORTABLE-Urgent (25 @ 15:34) >  IMPRESSION:    No focal opacity.    Calcified mediastinal lymph nodes.    < end of copied text >    -OTHER:  < from: CT Angio Chest PE Protocol w/ IV Cont (25 @ 16:14) >  FINDINGS:    LUNGS AND LARGE AIRWAYS: Patent central airways. No pulmonary nodules. No   focal consolidation, effusion or pneumothorax. Bilateral upper lobe   scarring with areas of calcification along the fissure on the right.  PLEURA: No pleural effusion.  VESSELS: No pulmonary embolism.  HEART: Heart size is normal. No pericardial effusion.  MEDIASTINUM AND THERESE: Calcified mediastinal and hilar lymph nodes   consistent with sequela of granulomatous disease.  CHEST WALL AND LOWER NECK: Within normal limits.  VISUALIZED UPPER ABDOMEN: Hepatic cysts measuring up to 2.7 cm in the   left hepatic lobe.  BONES: Diffuse osteopenia with degenerative changes of the spine. There   is T1 vertebral body hemangioma.    IMPRESSION:  No acute pulmonary embolus.    < end of copied text >      < from: [facility] Duplex Lower Ext Vein Scan, Bilat (25 @ 16:38) >    IMPRESSION:  No evidence of deep venous thrombosis in either lower extremity.    < end of copied text >    EC Lead ECG:   Ventricular Rate 95 BPM    Atrial Rate 95 BPM    P-R Interval 152 ms    QRS Duration 122 ms    Q-T Interval 398 ms    QTC Calculation(Bazett) 500 ms    P Axis 51 degrees    R Axis 5 degrees    T Axis -29 degrees    Diagnosis Line Sinus rhythm with Fusion complexes  Right bundle branch block  T wave abnormality, consider inferolateral ischemia  Abnormal ECG  Confirmed by JANEL ALARCON, AMADOU ([id number]) on 2025 10:05:58 PM ( @ 14:22)

## 2025-07-24 NOTE — ED CDU PROVIDER DISPOSITION NOTE - PATIENT PORTAL LINK FT
You can access the FollowMyHealth Patient Portal offered by [facility] by registering at the following website: [email or web address]. By joining [email or web address] FollowMyHealth portal, you will also be able to view your health information using other applications (apps) compatible with our system.

## 2025-07-24 NOTE — CONSULT NOTE ADULT - ASSESSMENT
70 year old female with PMHx HTN, HLD, sarcoidosis (pulmonary biopsy dx), who presented to the ED with complaint of palpitations. Cardiology consulted for assistance with further diagnostic testing.     Palpitations  Recent Orthopedic Surgery  - EKG RBBB without ischemic changes  - CTA pulmonary artery negative for PE  - No indication for further inpatient cardiovascular diagnostic testing  - She has appointment with cardiologist on 7/29 (5 days)  - Holter monitor and further testing can be arranged in outpatient setting      Discussed and seen with Dr. Kirk  Discussed with ED team     Uche Amado MD  Cardiology Fellow PGY4 70 year old female with PMHx HTN, HLD, sarcoidosis (pulmonary biopsy dx), who presented to the ED with complaint of palpitations. Cardiology consulted for assistance with further diagnostic testing.     Palpitations currently resolved, EKG in ED sinus rhythm with RBBB   Recent Orthopedic Surgery  - EKG RBBB without ischemic changes  - CTA pulmonary artery negative for PE  - No need for further inpatient cardiovascular  testing at this point   - may benefit of holter monitor arranged with her primary cardiologist She has appointment with cardiologist on 7/29 (5 days)   - Holter monitor and further testing can be arranged in outpatient setting

## 2025-07-24 NOTE — ED CDU PROVIDER SUBSEQUENT DAY NOTE - ATTENDING APP SHARED VISIT CONTRIBUTION OF CARE
70-year-old female past medical history of past medical history hypertension hyperlipidemia sarcoid right knee replacement 2 weeks ago presents with few days of shortness of breath and palpitations.  Patient seen in ED had EKG done that was normal sinus, right bundle branch block.  Troponin 24 and 21.  Cardiologist in [place].  Non-smoker.  CTA negative for PE.  Placed in observation unit for CCTA.  Feels fine no complaints at this time.    On exam, AFVSS, Well appearing, No acute distress, NCAT, EOMI, PERRLA, MMM, Neck supple, LCTAB, RRR nl s1s2 No mrg, Abdomen Soft NTND, AAOx3, No Focal Deficits, No LE edema or calf TTP,    A/P; shortness of breath palpitations pending CCTA, ACS and PE ruled out

## 2025-07-24 NOTE — ED CDU PROVIDER SUBSEQUENT DAY NOTE - PROGRESS NOTE DETAILS
Patient comfortable, awaiting CCTA. Patient comfortable, awaiting CCTA. Patient advised of lung changes and hepatic cyst seen on CT scan.  Copy of results given to pt for  outpatient follow-up Patient states she has an allergy/reaction to nitroglycerin as well as stress tests Patient reports having passed out after taking 1 dose of nitroglycerin in the past.  Patient offered nuclear stress test but also reports that she has passed out after getting the medication  portion of the nuclear stress test.  Cardiology consulted. Patient seen by cardiology fellow and Dr Kirk. Can be dc home with outpatient f/u with her cardiologist. Patient agreeable with plan.

## 2025-07-24 NOTE — ED CDU PROVIDER DISPOSITION NOTE - NSFOLLOWUPINSTRUCTIONS_ED_ALL_ED_FT
Shortness of breath    Shortness of breath means you have trouble breathing and could indicate a medical problem. Causes include lung diseases, heart disease, low amount of red blood cells (anemia), poor physical fitness, being overweight, smoking, etc. Your health care provider may not be able to find a cause for your shortness of breath after your exam. In this case, it is important to have a follow-up exam with your primary care physician as instructed. If medicines were prescribed, take them as directed for the full length of time directed. Refrain from tobacco products.    SEEK IMMEDIATE MEDICAL CARE IF YOU HAVE THE FOLLOWING SYMPTOMS: worsening shortness of breath, chest pain, back pain, abdominal pain, fever, coughing up blood, lightheadedness/dizziness.       Palpitations    A palpitation is the feeling that your heartbeat is irregular or is faster than normal. It may feel like your heart is fluttering or skipping a beat. They may be caused by many things, including smoking, caffeine, alcohol, stress, and certain medicines. Although most causes of palpitations are not serious, palpitations can be a sign of a serious medical problem. Avoid caffeine, alcohol, tobacco products at home. Try to reduce stress and anxiety, and make sure to get plenty of rest.     SEEK IMMEDIATE MEDICAL CARE IF YOU HAVE THE FOLLOWING SYMPTOMS: chest pain, shortness of breath, severe headache, or dizziness/lightheadedness.

## 2025-07-24 NOTE — ED CDU PROVIDER DISPOSITION NOTE - CLINICAL COURSE
Patient states she is unable to tolerate nitroglycerin for CCTA as well as stress test.  Cardiology consulted and evaluated patient at bedside.  States patient can be DC'd home and follow-up with her cardiologist in 5 days as previously scheduled.  Can have Holter and further testing as outpatient.  Troponin 24 and 21.  No chest pain.  Has had intermittent palpitations and shortness of breath.  BNP normal.  No hypoxia or respiratory distress.  No PE or DVT.  Patient comfortable with plan DC home follow-up as outpatient with her cardiologist, strict return precautions

## 2025-07-24 NOTE — CONSULT NOTE ADULT - ATTENDING COMMENTS
Patient seen and examined at bedside  currently stable asymptomatic  PE ruled out  outpatient follow up with primary cardiologist  other assessment and plan as per above.